# Patient Record
Sex: MALE | Race: BLACK OR AFRICAN AMERICAN | Employment: FULL TIME | URBAN - METROPOLITAN AREA
[De-identification: names, ages, dates, MRNs, and addresses within clinical notes are randomized per-mention and may not be internally consistent; named-entity substitution may affect disease eponyms.]

---

## 2020-08-07 ENCOUNTER — APPOINTMENT (OUTPATIENT)
Dept: GENERAL RADIOLOGY | Facility: CLINIC | Age: 20
DRG: 917 | End: 2020-08-07
Attending: EMERGENCY MEDICINE

## 2020-08-07 ENCOUNTER — HOSPITAL ENCOUNTER (INPATIENT)
Facility: CLINIC | Age: 20
LOS: 1 days | Discharge: HOME | DRG: 917 | End: 2020-08-08
Attending: EMERGENCY MEDICINE | Admitting: INTERNAL MEDICINE

## 2020-08-07 DIAGNOSIS — T40.601A OPIATE OVERDOSE, ACCIDENTAL OR UNINTENTIONAL, INITIAL ENCOUNTER (H): ICD-10-CM

## 2020-08-07 DIAGNOSIS — J96.01 ACUTE RESPIRATORY FAILURE WITH HYPOXIA (H): ICD-10-CM

## 2020-08-07 DIAGNOSIS — J69.0 ASPIRATION PNEUMONITIS (H): ICD-10-CM

## 2020-08-07 PROBLEM — T50.901A DRUG OVERDOSE: Status: ACTIVE | Noted: 2020-08-07

## 2020-08-07 LAB
ALBUMIN SERPL-MCNC: 4 G/DL (ref 3.4–5)
ALP SERPL-CCNC: 57 U/L (ref 40–150)
ALT SERPL W P-5'-P-CCNC: 33 U/L (ref 0–70)
AMPHETAMINES UR QL SCN: NEGATIVE
ANION GAP SERPL CALCULATED.3IONS-SCNC: 10 MMOL/L (ref 3–14)
APAP SERPL-MCNC: <2 MG/L (ref 10–20)
AST SERPL W P-5'-P-CCNC: 37 U/L (ref 0–45)
BARBITURATES UR QL: NEGATIVE
BASOPHILS # BLD AUTO: 0 10E9/L (ref 0–0.2)
BASOPHILS NFR BLD AUTO: 0.4 %
BENZODIAZ UR QL: NEGATIVE
BILIRUB SERPL-MCNC: 0.4 MG/DL (ref 0.2–1.3)
BUN SERPL-MCNC: 11 MG/DL (ref 7–30)
CALCIUM SERPL-MCNC: 8.4 MG/DL (ref 8.5–10.1)
CANNABINOIDS UR QL SCN: POSITIVE
CHLORIDE SERPL-SCNC: 106 MMOL/L (ref 94–109)
CO2 SERPL-SCNC: 25 MMOL/L (ref 20–32)
COCAINE UR QL: NEGATIVE
CREAT SERPL-MCNC: 1.05 MG/DL (ref 0.66–1.25)
DIFFERENTIAL METHOD BLD: ABNORMAL
EOSINOPHIL # BLD AUTO: 0.6 10E9/L (ref 0–0.7)
EOSINOPHIL NFR BLD AUTO: 6 %
ERYTHROCYTE [DISTWIDTH] IN BLOOD BY AUTOMATED COUNT: 13.5 % (ref 10–15)
ETHANOL SERPL-MCNC: <0.01 G/DL
GFR SERPL CREATININE-BSD FRML MDRD: >90 ML/MIN/{1.73_M2}
GLUCOSE SERPL-MCNC: 133 MG/DL (ref 70–99)
HCT VFR BLD AUTO: 46.6 % (ref 40–53)
HGB BLD-MCNC: 14.5 G/DL (ref 13.3–17.7)
IMM GRANULOCYTES # BLD: 0 10E9/L (ref 0–0.4)
IMM GRANULOCYTES NFR BLD: 0.3 %
LYMPHOCYTES # BLD AUTO: 4.9 10E9/L (ref 0.8–5.3)
LYMPHOCYTES NFR BLD AUTO: 52.9 %
MAGNESIUM SERPL-MCNC: 2 MG/DL (ref 1.6–2.3)
MCH RBC QN AUTO: 31 PG (ref 26.5–33)
MCHC RBC AUTO-ENTMCNC: 31.1 G/DL (ref 31.5–36.5)
MCV RBC AUTO: 100 FL (ref 78–100)
MONOCYTES # BLD AUTO: 0.5 10E9/L (ref 0–1.3)
MONOCYTES NFR BLD AUTO: 5.8 %
NEUTROPHILS # BLD AUTO: 3.2 10E9/L (ref 1.6–8.3)
NEUTROPHILS NFR BLD AUTO: 34.6 %
NRBC # BLD AUTO: 0 10*3/UL
NRBC BLD AUTO-RTO: 0 /100
OPIATES UR QL SCN: NEGATIVE
PCP UR QL SCN: NEGATIVE
PLATELET # BLD AUTO: 267 10E9/L (ref 150–450)
POTASSIUM SERPL-SCNC: 3.9 MMOL/L (ref 3.4–5.3)
PROT SERPL-MCNC: 7.3 G/DL (ref 6.8–8.8)
RBC # BLD AUTO: 4.67 10E12/L (ref 4.4–5.9)
SALICYLATES SERPL-MCNC: <2 MG/DL
SODIUM SERPL-SCNC: 141 MMOL/L (ref 133–144)
TROPONIN I SERPL-MCNC: 0.16 UG/L (ref 0–0.04)
TROPONIN I SERPL-MCNC: 1.32 UG/L (ref 0–0.04)
WBC # BLD AUTO: 9.2 10E9/L (ref 4–11)

## 2020-08-07 PROCEDURE — U0003 INFECTIOUS AGENT DETECTION BY NUCLEIC ACID (DNA OR RNA); SEVERE ACUTE RESPIRATORY SYNDROME CORONAVIRUS 2 (SARS-COV-2) (CORONAVIRUS DISEASE [COVID-19]), AMPLIFIED PROBE TECHNIQUE, MAKING USE OF HIGH THROUGHPUT TECHNOLOGIES AS DESCRIBED BY CMS-2020-01-R: HCPCS | Performed by: EMERGENCY MEDICINE

## 2020-08-07 PROCEDURE — 84484 ASSAY OF TROPONIN QUANT: CPT | Performed by: INTERNAL MEDICINE

## 2020-08-07 PROCEDURE — 80053 COMPREHEN METABOLIC PANEL: CPT | Performed by: EMERGENCY MEDICINE

## 2020-08-07 PROCEDURE — 93005 ELECTROCARDIOGRAM TRACING: CPT

## 2020-08-07 PROCEDURE — 80329 ANALGESICS NON-OPIOID 1 OR 2: CPT | Performed by: EMERGENCY MEDICINE

## 2020-08-07 PROCEDURE — 93010 ELECTROCARDIOGRAM REPORT: CPT | Performed by: INTERNAL MEDICINE

## 2020-08-07 PROCEDURE — 85025 COMPLETE CBC W/AUTO DIFF WBC: CPT | Performed by: EMERGENCY MEDICINE

## 2020-08-07 PROCEDURE — 12000000 ZZH R&B MED SURG/OB

## 2020-08-07 PROCEDURE — 80307 DRUG TEST PRSMV CHEM ANLYZR: CPT | Performed by: EMERGENCY MEDICINE

## 2020-08-07 PROCEDURE — 71046 X-RAY EXAM CHEST 2 VIEWS: CPT

## 2020-08-07 PROCEDURE — 99285 EMERGENCY DEPT VISIT HI MDM: CPT | Mod: 25

## 2020-08-07 PROCEDURE — C9803 HOPD COVID-19 SPEC COLLECT: HCPCS

## 2020-08-07 PROCEDURE — 83735 ASSAY OF MAGNESIUM: CPT | Performed by: EMERGENCY MEDICINE

## 2020-08-07 PROCEDURE — 36415 COLL VENOUS BLD VENIPUNCTURE: CPT | Performed by: INTERNAL MEDICINE

## 2020-08-07 PROCEDURE — 84484 ASSAY OF TROPONIN QUANT: CPT | Performed by: EMERGENCY MEDICINE

## 2020-08-07 PROCEDURE — 99223 1ST HOSP IP/OBS HIGH 75: CPT | Mod: AI | Performed by: INTERNAL MEDICINE

## 2020-08-07 PROCEDURE — 80320 DRUG SCREEN QUANTALCOHOLS: CPT | Performed by: EMERGENCY MEDICINE

## 2020-08-07 RX ORDER — POTASSIUM CHLORIDE 1500 MG/1
20-40 TABLET, EXTENDED RELEASE ORAL
Status: DISCONTINUED | OUTPATIENT
Start: 2020-08-07 | End: 2020-08-08 | Stop reason: HOSPADM

## 2020-08-07 RX ORDER — POTASSIUM CL/LIDO/0.9 % NACL 10MEQ/0.1L
10 INTRAVENOUS SOLUTION, PIGGYBACK (ML) INTRAVENOUS
Status: DISCONTINUED | OUTPATIENT
Start: 2020-08-07 | End: 2020-08-08 | Stop reason: HOSPADM

## 2020-08-07 RX ORDER — POTASSIUM CHLORIDE 29.8 MG/ML
20 INJECTION INTRAVENOUS
Status: DISCONTINUED | OUTPATIENT
Start: 2020-08-07 | End: 2020-08-08 | Stop reason: HOSPADM

## 2020-08-07 RX ORDER — HEPARIN SODIUM 10000 [USP'U]/100ML
700 INJECTION, SOLUTION INTRAVENOUS CONTINUOUS
Status: DISCONTINUED | OUTPATIENT
Start: 2020-08-07 | End: 2020-08-08

## 2020-08-07 RX ORDER — MAGNESIUM SULFATE HEPTAHYDRATE 40 MG/ML
4 INJECTION, SOLUTION INTRAVENOUS EVERY 4 HOURS PRN
Status: DISCONTINUED | OUTPATIENT
Start: 2020-08-07 | End: 2020-08-08 | Stop reason: HOSPADM

## 2020-08-07 RX ORDER — LIDOCAINE 40 MG/G
CREAM TOPICAL
Status: DISCONTINUED | OUTPATIENT
Start: 2020-08-07 | End: 2020-08-08 | Stop reason: HOSPADM

## 2020-08-07 RX ORDER — POTASSIUM CHLORIDE 7.45 MG/ML
10 INJECTION INTRAVENOUS
Status: DISCONTINUED | OUTPATIENT
Start: 2020-08-07 | End: 2020-08-08 | Stop reason: HOSPADM

## 2020-08-07 RX ORDER — NALOXONE HYDROCHLORIDE 0.4 MG/ML
.1-.4 INJECTION, SOLUTION INTRAMUSCULAR; INTRAVENOUS; SUBCUTANEOUS
Status: DISCONTINUED | OUTPATIENT
Start: 2020-08-07 | End: 2020-08-08 | Stop reason: HOSPADM

## 2020-08-07 RX ORDER — POTASSIUM CHLORIDE 1.5 G/1.58G
20-40 POWDER, FOR SOLUTION ORAL
Status: DISCONTINUED | OUTPATIENT
Start: 2020-08-07 | End: 2020-08-08 | Stop reason: HOSPADM

## 2020-08-07 RX ORDER — ALBUTEROL SULFATE 90 UG/1
2 AEROSOL, METERED RESPIRATORY (INHALATION) EVERY 4 HOURS PRN
Status: DISCONTINUED | OUTPATIENT
Start: 2020-08-07 | End: 2020-08-08 | Stop reason: HOSPADM

## 2020-08-07 RX ORDER — FLUTICASONE PROPIONATE 44 UG/1
2 AEROSOL, METERED RESPIRATORY (INHALATION) EVERY 12 HOURS
Status: DISCONTINUED | OUTPATIENT
Start: 2020-08-07 | End: 2020-08-08 | Stop reason: HOSPADM

## 2020-08-07 RX ORDER — ASPIRIN 81 MG/1
81 TABLET ORAL DAILY
Status: DISCONTINUED | OUTPATIENT
Start: 2020-08-08 | End: 2020-08-08 | Stop reason: HOSPADM

## 2020-08-07 ASSESSMENT — ENCOUNTER SYMPTOMS
CHEST TIGHTNESS: 1
COUGH: 1
ABDOMINAL PAIN: 0
VOMITING: 1
HEADACHES: 0

## 2020-08-07 ASSESSMENT — MIFFLIN-ST. JEOR: SCORE: 1721.12

## 2020-08-07 NOTE — ED TRIAGE NOTES
Patient arrives from home after overdosing on 30 mg of percocet per patient. EMS reports snoring respirations on scene. Patient bagged with NPA in en route. 0.5 mg IV narcan and patient became alert and oriented. One episode of emesis. Vitally stable en route. Here, patient is awake and alert. Airway patent.

## 2020-08-07 NOTE — ED NOTES
[facility]  ED Nurse Handoff Report    Hugh Song is a 20 year old male   ED Chief complaint: Drug Overdose  . ED Diagnosis:   Final diagnoses:   Aspiration pneumonitis (H)   Acute respiratory failure with hypoxia (H)   Opiate overdose, accidental or unintentional, initial encounter (H)     Allergies: No Known Allergies    Code Status: Please review  Activity level - Baseline/Home:  Independent. Activity Level - Current:   Stand by Assist. Lift room needed: No. Bariatric: No   Needed: No   Isolation: No. Infection: Not Applicable.     Vital Signs:   Vitals:    08/07/20 1720 08/07/20 1726 08/07/20 1731 08/07/20 1732   BP:  122/78     Pulse:  92     Resp: 17 15 12    Temp:       TempSrc:       SpO2: 92%  (!) 87% 93%       Cardiac Rhythm:  ,      Pain level:    Patient confused: No. Patient Falls Risk: Yes.   Elimination Status: Has not yet voided   Patient Report - Initial Complaint: Patient comes to ED via EMS for evaluation of overdose. Patien states that he took 30mg of percocet recreationally. He denies thoughts of harming himself or others. Patient was found with decreased LOC by EMS and needed airway support via NPA and respirations via bag-valve mask. He received 0.5mg narcan en route. He did vomit en route. Patient continues to be hypoxic on room air and is being admitted for drug overdose and aspiration pneumonia. Focused Assessment: Course breath sounds in bases bilaterally. Patient is currently alert. He is on 4L NC with SpO2 99%. He does have an elevated troponin.  Tests Performed:   Labs Ordered and Resulted from Time of ED Arrival Up to the Time of Departure from the ED   COMPREHENSIVE METABOLIC PANEL - Abnormal; Notable for the following components:       Result Value    Glucose 133 (*)     Calcium 8.4 (*)     All other components within normal limits   CBC WITH PLATELETS DIFFERENTIAL - Abnormal; Notable for the following components:    MCHC 31.1 (*)     All other  components within normal limits   TROPONIN I - Abnormal; Notable for the following components:    Troponin I ES 0.159 (*)     All other components within normal limits   ALCOHOL ETHYL   ACETAMINOPHEN LEVEL   SALICYLATE LEVEL   GLUCOSE MONITOR NURSING POCT   DRUG ABUSE SCREEN 77 URINE (FL, RH, SH)   COVID-19 VIRUS (CORONAVIRUS) BY PCR   PERIPHERAL IV CATHETER   CARDIAC CONTINUOUS MONITORING     XR Chest 2 Views   Final Result   IMPRESSION: Negative chest.        . Abnormal Results: see above  Treatments provided: No treatments given here, aside from oxygen.  Family Comments: wife at bedside  OBS brochure/video discussed/provided to patient:  N/A  ED Medications: Medications - No data to display  Drips infusing:  No  For the majority of the shift, the patient's behavior Green. Interventions performed were NA.    Sepsis treatment initiated: No       ED Nurse Name/Phone Number: Mariya Cevallos RN,   6:12 PM  RECEIVING UNIT ED HANDOFF REVIEW    Above ED Nurse Handoff Report was reviewed: Yes  Reviewed by: Lara Tirado, RN on August 7, 2020 at 8:11 PM   Did you vocera the ED RN: YES

## 2020-08-07 NOTE — ED NOTES
Bed: ED22  Expected date: 8/7/20  Expected time: 4:27 PM  Means of arrival:   Comments:  BV5 20M drugs aox3

## 2020-08-07 NOTE — H&P
[facility]    Hospitalist History and Physical    Name: Hugh Song    MRN: [id number]  YOB: 2000    Age: 20 year old  Date of Admission:  8/7/2020  Date of Service (when I saw the patient): 08/07/20    Assessment & Plan   Hugh Song is a 20 year old male with past medical history significant for asthma was brought to the emergency room for drug overdose.  Patient reportedly became unconscious EMS was called required bag ventilation and Narcan.  Patient responded to Narcan with immediate improvement of his breathing and mental status.  Per patient he woke Percocet 30mg to help him sleep denies any suicidal ideation.  Patient says he occasionally uses marijuana and Percocet.  In the emergency room on presentation patient was hypoxic with sats of 87% on room air evaluation showed elevated troponin.    Acute respiratory failure due to drug overdose  -Hypoxic in ER requiring supplemental O2 up to 4 L  -Initially needed bag ventilation.  -Responded to Narcan  -Per report patient occasionally uses Percocet which he gets from his neighbors/friends  -Denies any other drug use denies meth cocaine or alcohol use  -Occasionally takes marijuana  -Clinically improving after Narcan.  Mental status normal  -Wean supplemental O2  -Incentive spirometer  -Monitor continuous pulse oximeter      Drug overdose  -Denies any suicidal ideation  -Using it for recreational purpose  -Stating use of Percocet mainly to help him sleep  -Narcan ordered PRN  -Continuous pulse oximeter  -Tox screen ordered pending    Elevated troponin  -EKG showed early repolarization  -Patient was hypoxic required bag ventilation suspect demand ischemia  -We will trend troponin  -Monitor on telemetry  -Echocardiogram in a.m.  -No chest pain or shortness of breath at this time    History of asthma  -Continue prior to admission albuterol and fluticasone      DVT Prophylaxis: Pneumatic Compression Devices  Code Status: Full  Code    Disposition: Admitted    Primary Care Physician   Physician No Ref-Primary    Chief Complaint   Drug OD    History is obtained from the patient    History of Present Illness   Hugh Song is a 20 year old male with past medical history significant for asthma was brought to the emergency room via EMS.  Patient recalls waking up with EMS personnel helping him with ventilation.  Prior to that he recalls walking towards his home after he used Percocet which he got from his friend or neighbor.  He says he is not suicidal occasionally takes Percocet and uses marijuana.  Today took Percocet to help him sleep.    ER report from EMS patient's brother called 911 and they were both using Percocet for recreational purposes he was found unresponsive.  CPR was initiated and required bag ventilation and responded to Narcan on the field.  When he came to the emergency room he was mildly hypoxic requiring supplemental O2.     EValuation in the emergency room showed elevated troponin    Patient is being admitted for observation and further evaluation.    Past Medical History    Past Medical History:   Diagnosis Date     Uncomplicated asthma          Past Surgical History   History reviewed. No pertinent surgical history.    Prior to Admission Medications   Prior to Admission Medications   Prescriptions Last Dose Informant Patient Reported? Taking?   albuterol (ALBUTEROL) 108 (90 BASE) MCG/ACT inhaler   No No   Sig: Inhale 2 puffs into the lungs every 4 hours as needed for shortness of breath / dyspnea      Facility-Administered Medications: None     Allergies   No Known Allergies    Social History   Social History     Tobacco Use     Smoking status: Smoker, Current Status Unknown     Packs/day: 0.50   Substance Use Topics     Alcohol use: Not on file     Social History     Social History Narrative     Not on file     Lives with mom, has 3 children, quit smoking 2 weeks    Family History   I have reviewed this patient's  family history and updated it with pertinent information if needed.   History reviewed. No pertinent family history.  Denies any significant family history.  Does give family history of hypertension    Review of Systems   A Comprehensive greater than 10 system review of systems was carried out.  Pertinent positives and negatives are noted above.  Otherwise negative for contributory information.    Physical Exam   Temp: 98.2  F (36.8  C) Temp src: Oral BP: 122/71 Pulse: 87 Heart Rate: 86 Resp: 12 SpO2: 98 % O2 Device: Nasal cannula Oxygen Delivery: 4 LPM  Vital Signs with Ranges  Temp:  [98.2  F (36.8  C)] 98.2  F (36.8  C)  Pulse:  [86-92] 87  Heart Rate:  [78-93] 86  Resp:  [12-21] 12  BP: (120-122)/(71-95) 122/71  SpO2:  [87 %-98 %] 98 %  0 lbs 0 oz    GEN:  Alert, oriented x 3, appears comfortable, no overt distress  HEENT:  Normocephalic/atraumatic, no scleral icterus, no nasal discharge, mouth moist.  CV:  Regular rate and rhythm, no murmur or JVD.  S1 + S2 noted, no S3 or S4.  LUNGS:  Clear to auscultation bilaterally without rales/rhonchi/wheezing/retractions.  Symmetric chest rise on inhalation noted.  ABD:  Active bowel sounds, soft, non-tender/non-distended.  No rebound/guarding/rigidity.  EXT:  No edema.  No cyanosis.  No joint synovitis noted.  SKIN:  Dry to touch, no exanthems noted in the visualized areas.  NEURO:  Symmetric muscle strength, No new focal deficits appreciated.    Data   Data reviewed today:  I personally reviewed the EKG tracing showing Normal sinus rhythm with signs of early repolarization and nonspecific ST-T change.    No results for input(s): PH, PHV, PO2, PO2V, SAT, PCO2, PCO2V, HCO3, HCO3V in the last 168 hours.  Recent Labs   Lab 08/07/20  1639   WBC 9.2   HGB 14.5   HCT 46.6           Recent Labs   Lab 08/07/20  1639      POTASSIUM 3.9   CHLORIDE 106   CO2 25   ANIONGAP 10   *   BUN 11   CR 1.05   GFRESTIMATED >90   GFRESTBLACK >90   ARIEL 8.4*     No  results for input(s): CULT in the last 168 hours.  Recent Labs   Lab 08/07/20  1639      POTASSIUM 3.9   CHLORIDE 106   CO2 25   ANIONGAP 10   *   BUN 11   CR 1.05   GFRESTIMATED >90   GFRESTBLACK >90   ARIEL 8.4*   PROTTOTAL 7.3   ALBUMIN 4.0   BILITOTAL 0.4   ALKPHOS 57   AST 37   ALT 33     No results for input(s): NTBNPI, NTBNP in the last 168 hours.  Recent Labs   Lab 08/07/20  1639   AST 37   ALT 33   ALKPHOS 57   BILITOTAL 0.4     No results for input(s): INR in the last 168 hours.  No results for input(s): LACT in the last 168 hours.  No results for input(s): LIPASE in the last 168 hours.  No results for input(s): CHOL, HDL, LDL, TRIG, CHOLHDLRATIO in the last 168 hours.  No results for input(s): TSH in the last 168 hours.  Recent Labs   Lab 08/07/20  1639   TROPI 0.159*     No results for input(s): COLOR, APPEARANCE, URINEGLC, URINEBILI, URINEKETONE, SG, UBLD, URINEPH, PROTEIN, UROBILINOGEN, NITRITE, LEUKEST, RBCU, WBCU in the last 168 hours.    Recent Results (from the past 24 hour(s))   XR Chest 2 Views    Narrative    EXAM: XR CHEST 2 VW  LOCATION: [facility]  DATE/TIME: 8/7/2020 5:34 PM    INDICATION:  opiate overdose, rales bilaterally  COMPARISON: None.      Impression    IMPRESSION: Negative chest.

## 2020-08-07 NOTE — ED PROVIDER NOTES
History     Chief Complaint:  Drug Overdose     HPI  Hugh Song is a 20 year old year old male with a history of asthma who presents for evaluation of drug overdose. The patient arrives via EMS after taking a single pill of 30 mg percocet. He states he has taken 30 mg before but has not had this kind of reaction. He reports he was at home and was using the drug recreationally and was not intending to harm himself or intentionally overdose. He denies any suicidal ideation. The patient was reportedly unarousable with poor respirations. EMS placed a nasopharyngeal airway and bagged the patient prior to arrival. They gave him 0.5mg narcan en route with immediate improvement in his breathing and mental status. The patient just remembers waking up on the floor with EMS and first responders around him. He denies any other recent health problems.  He denies any coingestions.  He reports that he is a prior smoker of tobacco and does continue to smoke marijuana.  He denies any other illicit substance abuse.  He endorses chest tightness, cough, and vomiting. He denies abdominal pain, chest pain, headache, or other pains.  He denies any numbness tingling or weakness.  No recent fevers, illness.  He denies any other symptoms at this time.    Allergies:  No Known Drug Allergies    Medications:   Albuterol inhaler  Flovent diskus    Medical History:   Uncomplicated asthma    Surgical History   Surgical history reviewed. No pertinent surgical history.    Family History:   Family history reviewed. No pertinent family history.    Social History:  Smoking Status:  Smoker    Type: Cigarettes   Packs/Day: 0.5  Smokeless Tobacco: Never Used   Alcohol Use: Not on file  Drug Use: Positive (marijuana, percocet)  No Ref-Primary, Physician     Review of Systems   Respiratory: Positive for cough and chest tightness.    Cardiovascular: Negative for chest pain.   Gastrointestinal: Positive for vomiting. Negative for abdominal pain.    Neurological: Negative for headaches.   Psychiatric/Behavioral: Negative for suicidal ideas.   All other systems reviewed and are negative.    Physical Exam     Patient Vitals for the past 24 hrs:   BP Temp Temp src Pulse Heart Rate Resp SpO2   08/07/20 1732 -- -- -- -- -- -- 93 %   08/07/20 1731 -- -- -- -- -- 12 (!) 87 %   08/07/20 1726 122/78 -- -- 92 88 15 --   08/07/20 1720 -- -- -- -- 90 17 92 %   08/07/20 1638 -- -- -- -- 93 20 90 %   08/07/20 1637 -- -- -- -- 89 21 (!) 89 %   08/07/20 1634 (!) 120/95 98.2  F (36.8  C) Oral -- 89 14 91 %      Physical Exam  General: Nontoxic. Awake and alert. Resting comfortably  Head:  Scalp, face, and head appear normal, atraumatic non tender to palpation.  Eyes:  Pupils are equal, round, and reactive to light, EOMI, no nystagmus     Conjunctivae non-injected and sclerae white  ENT:    The external nose is normal    Pinnae are normal    The oropharynx is normal, mucous membranes moist    Uvula is in the midline  Neck:  Normal range of motion    There is no rigidity noted    Trachea is in the midline  CV:  Regular rate and rhythm     Normal S1/S2, no S3/S4    No murmur or rub. Radial pulses 2+ bilaterally   Resp:  Lungs are equal bilaterally    There is no tachypnea    No increased work of breathing    Coarse rales bilateral bases. No wheezing, or rhonchi  GI:  Abdomen is soft, no rigidity or guarding    No distension, or mass    No tenderness or rebound tenderness   MS:  Normal muscular tone. Full painless ROM of all extremities. Extremities non tender to palpation and atraumatic.    Symmetric motor strength    No lower extremity edema  Skin:  No rash or acute skin lesions noted  Neuro: A&Ox3, GCS 15    CN II - XII intact    Speech clear, fluent, and normal    Strength 5/5 and symmetric in bilateral upper and lower extremities.    No pronator drift. No leg drift. SILT throughout.    No ankle clonus    FTN testing normal. No tremor.     No meningismus   Psych:  Normal  affect.  Appropriate interactions.  Denies any suicidal ideation.  Denies that the overdose was an attempt at self-harm.  Denies any significant depression or anxiety.  No evidence of response to internal stimuli, hallucinations or psychosis.  Thought process is clear and goal-directed.    Emergency Department Course     ECG:  ECG taken at 1709, ECG read at 1723  Normal sinus rhythm  ST elevation, consider early repolarization, pericarditis, or injury  Nonspecific ST and T wave abnormality  Abnormal ECG  Rate 90 bpm. MD interval 174 ms. QRS duration 98 ms. QT/QTc 368/450 ms. P-R-T axes 61 49 44.    Imaging:  Radiology results were communicated with the patient who voiced understanding of the findings.    XR Chest 2 Views  IMPRESSION: Negative chest. Reading per radiology     Laboratory:  Laboratory findings were communicated with the patient who voiced understanding of the findings.    Asymptomatic COVID-19 Virus (Coronavirus) PCR: In process     Troponin I (Collected 1639): 0.159(HH)    Alcohol ethyl: <0.01  Acetaminophen level: <2  Salicylate level: <2    CBC: WBC 9.2, HGB 14.5,   CMP: Glucose 133 (H), Calcium 8.4 (L) (Creatinine 1.05) o/w WNL     Emergency Department Course:  1639 IV was inserted and blood was drawn for laboratory testing, results above.    1646 Nursing notes and vitals reviewed.    1709 EKG obtained as noted above.    1715 I performed an exam of the patient as documented above.     1734 The patient was sent for XR while in the emergency department, results above.     1847 I consulted with Dr. Valverde of the hospitalist services. They are in agreement to accept the patient for admission. Findings and plan explained to the Patient who consents to admission. Discussed the patient with Dr. Valverde, who will admit the patient to a cardiac tele bed for further monitoring, evaluation, and treatment.    Impression & Plan     Covid-19  Hugh Song was evaluated during a global COVID-19  pandemic, which necessitated consideration that the patient might be at risk for infection with the SARS-CoV-2 virus that causes COVID-19.   Applicable protocols for evaluation were followed during the patient's care. COVID-19 was considered as part of the patient's evaluation. The plan for testing is: a test was obtained during this visit.    Medical Decision Making:  Hugh Song is a 20 year old male otherwise healthy, who presents after unintentional opiate overdose.  On my evaluation he is now awake alert and oriented appropriately.  He has no focal neurologic deficits.  He is hemodynamically stable and afebrile.  He does report chest tightness and cough with mild shortness of breath.  He does have mild hypoxia with oxygen saturations of 87% on room air.  On 3 L nasal cannula oxygen saturations improved to the mid 90s.  The patient was given assisted respirations by BVM prior to receiving Narcan.  Upon receiving Narcan his breathing and mental status improved significantly.  Patient denies any coingestions.  He denies any intent at self-harm, overdose or suicide.  Work-up in the emergency department feels normal CMP, CBC.  No hypoglycemia.  Ethanol, acetaminophen, salicylate levels are negative.  EKG reveals nonspecific ST changes.  Chest x-ray is negative for any significant pulmonary edema, pneumothorax, pleural effusions or other acute thoracic pathology.  Patient's hypoxia is likely due to aspiration pneumonitis secondary to the opiate overdose and assisted respirations by bag valve mask.  Given the nonspecific ST changes and probable period of hypoxia troponin was obtained and was elevated.  This is likely secondary to his period of hypoxia after overdosing.  Signs and symptoms are not consistent with acute coronary syndrome.  Given his aspiration pneumonitis with hypoxia, elevated troponin and risk for recurrent somnolence patient will be admitted to hospitalist for ongoing monitoring evaluation and  treatment.  The case was discussed with the hospitalist and the patient was admitted in stable condition.    Diagnosis:     ICD-10-CM    1. Aspiration pneumonitis (H)  J69.0    2. Acute respiratory failure with hypoxia (H)  J96.01    3. Opiate overdose, accidental or unintentional, initial encounter (H)  T40.601A       Disposition:  Admitted     Scribe Disclosure:  Lara BEAR, am serving as a scribe at 4:57 PM on 8/7/2020 to document services personally performed by Joselito Fu MD based on my observations and the provider's statements to me.      Joselito Fu MD  08/07/20 [id number]

## 2020-08-08 ENCOUNTER — APPOINTMENT (OUTPATIENT)
Dept: CARDIOLOGY | Facility: CLINIC | Age: 20
DRG: 917 | End: 2020-08-08
Attending: INTERNAL MEDICINE

## 2020-08-08 VITALS
TEMPERATURE: 98.2 F | HEIGHT: 68 IN | DIASTOLIC BLOOD PRESSURE: 52 MMHG | OXYGEN SATURATION: 100 % | WEIGHT: 163.5 LBS | BODY MASS INDEX: 24.78 KG/M2 | SYSTOLIC BLOOD PRESSURE: 106 MMHG | RESPIRATION RATE: 16 BRPM | HEART RATE: 64 BPM

## 2020-08-08 LAB
ANION GAP SERPL CALCULATED.3IONS-SCNC: 1 MMOL/L (ref 3–14)
BUN SERPL-MCNC: 9 MG/DL (ref 7–30)
CALCIUM SERPL-MCNC: 8.3 MG/DL (ref 8.5–10.1)
CHLORIDE SERPL-SCNC: 108 MMOL/L (ref 94–109)
CO2 SERPL-SCNC: 31 MMOL/L (ref 20–32)
CREAT SERPL-MCNC: 0.89 MG/DL (ref 0.66–1.25)
GFR SERPL CREATININE-BSD FRML MDRD: >90 ML/MIN/{1.73_M2}
GLUCOSE SERPL-MCNC: 81 MG/DL (ref 70–99)
LMWH PPP CHRO-ACNC: 0.48 IU/ML
MAGNESIUM SERPL-MCNC: 1.9 MG/DL (ref 1.6–2.3)
POTASSIUM SERPL-SCNC: 4 MMOL/L (ref 3.4–5.3)
SARS-COV-2 RNA SPEC QL NAA+PROBE: NOT DETECTED
SODIUM SERPL-SCNC: 140 MMOL/L (ref 133–144)
SPECIMEN SOURCE: NORMAL
TROPONIN I SERPL-MCNC: 0.71 UG/L (ref 0–0.04)

## 2020-08-08 PROCEDURE — 84484 ASSAY OF TROPONIN QUANT: CPT | Performed by: INTERNAL MEDICINE

## 2020-08-08 PROCEDURE — 36415 COLL VENOUS BLD VENIPUNCTURE: CPT | Performed by: INTERNAL MEDICINE

## 2020-08-08 PROCEDURE — 25000128 H RX IP 250 OP 636: Performed by: INTERNAL MEDICINE

## 2020-08-08 PROCEDURE — 93005 ELECTROCARDIOGRAM TRACING: CPT

## 2020-08-08 PROCEDURE — 93306 TTE W/DOPPLER COMPLETE: CPT

## 2020-08-08 PROCEDURE — 93306 TTE W/DOPPLER COMPLETE: CPT | Mod: 26 | Performed by: INTERNAL MEDICINE

## 2020-08-08 PROCEDURE — 83735 ASSAY OF MAGNESIUM: CPT | Performed by: INTERNAL MEDICINE

## 2020-08-08 PROCEDURE — 99239 HOSP IP/OBS DSCHRG MGMT >30: CPT | Performed by: INTERNAL MEDICINE

## 2020-08-08 PROCEDURE — 25000132 ZZH RX MED GY IP 250 OP 250 PS 637: Performed by: INTERNAL MEDICINE

## 2020-08-08 PROCEDURE — 85520 HEPARIN ASSAY: CPT | Performed by: INTERNAL MEDICINE

## 2020-08-08 PROCEDURE — 80048 BASIC METABOLIC PNL TOTAL CA: CPT | Performed by: INTERNAL MEDICINE

## 2020-08-08 PROCEDURE — 99222 1ST HOSP IP/OBS MODERATE 55: CPT | Performed by: INTERNAL MEDICINE

## 2020-08-08 RX ADMIN — Medication 4000 UNITS: at 00:42

## 2020-08-08 RX ADMIN — ASPIRIN 81 MG: 81 TABLET, COATED ORAL at 09:29

## 2020-08-08 RX ADMIN — HEPARIN SODIUM 850 UNITS/HR: 10000 INJECTION, SOLUTION INTRAVENOUS at 00:44

## 2020-08-08 ASSESSMENT — ACTIVITIES OF DAILY LIVING (ADL)
ADLS_ACUITY_SCORE: 15

## 2020-08-08 ASSESSMENT — MIFFLIN-ST. JEOR: SCORE: 1716.13

## 2020-08-08 NOTE — PROGRESS NOTES
CTS     Per chart review, patient has no established PCP. Met with patient for establishing care with PCP. Discussed importance of following up and establishing care of his choice. Patient states he is new to the [facility]. He requested to follow up with [facility] provider. A follow up appointment was scheduled with Jennifer Allen at [facility] on Tuesday, August 18th at 1:20 pm.     Patient reports being in a safe environment at home with his family. His s.o, Ida, will provide transportation at discharge. Patient offered resources for chem dep. He declined, stating he doesn't plan on using percocet again. Patient states he will follow up with [facility].     CTS will follow for any additional discharge needs.     Ani Pedro RN BSN PHN CCM   Inpatient Care Coordination   [facility]   [phone]

## 2020-08-08 NOTE — PROGRESS NOTES
"    Brief Hospitalist Cross cover note    Reason for the call : EKG review @7907    Actions taken :    EMR reviewed.    Discussed with treatment team- yes.   o No cp or sob   o Stable vital signs     Patient seen and examined :no     Significant findings/Current problem:    Vital signs : /45 (BP Location: Right arm)   Pulse 64   Temp 98.2  F (36.8  C) (Oral)   Resp 14   Ht 1.727 m (5' 8\")   Wt 74.7 kg (164 lb 9.6 oz)   SpO2 98%   BMI 25.03 kg/m       Abnormal EKG with ST segment elevation in lateral leads but no significant change compared with earlier EKG 17:09  Lab Results   Component Value Date    TROPI 1.325 () 08/07/2020    TROPI 0.159 () 08/07/2020           Suspect ACS   Recommendation/Plan:    Continue heparin and symptom monitoring     Echo and cardiology consult planned     Critical Care time: none    "

## 2020-08-08 NOTE — DISCHARGE INSTRUCTIONS
Your hospital follow up appointment has been scheduled for you with Jennifer Allen at [facility] for Tuesday, August 18th at 1:20pm.  Please bring your hospital discharge instructions, a photo ID, and insurance information with you to your appointment.  Please call the clinic at [phone] if you need to reschedule.

## 2020-08-08 NOTE — PLAN OF CARE
RN-  Neuro- A&O.   Activity- Up in room with SBA.   Pain- Denies.   Respiratory- Lungs clear. 3LNC.   Cardiac- NSR.   Musculoskeletal- No concerns noted.   GI/- Adequate UO.   Skin- No concerns noted.   Admitting physician notified of 2nd Trop trending up, no CP, VSS. No new orders. Will continue to monitor. Plan for ECHO in the AM. Bed alarm in place. Continue current POC.

## 2020-08-08 NOTE — PROGRESS NOTES
RN- Reviewed discharge instructions with patient. Verbalizes understanding. Signed discharge papers. Has F/U with PCP scheduled for 08-. Mother to transport home.

## 2020-08-08 NOTE — DISCHARGE SUMMARY
[facility]  Discharge Summary  Name: Hugh Song    MRN: [id number]  YOB: 2000    Age: 20 year old  Date of Discharge:  8/8/2020  Date of Admission: 8/7/2020  Primary Care Provider: No Ref-Primary, Physician  Discharge Physician:  Lorenzo De La Rosa MD  Discharging Service:  Hospitalist      Hospital Course/Discharge Diagnoses:  Please see HPI and initial assessment and plan from Dr. Valverde's H&P yesterday as italicized below:     History of Present Illness     Hugh Song is a 20 year old male with past medical history significant for asthma was brought to the emergency room via EMS.  Patient recalls waking up with EMS personnel helping him with ventilation.  Prior to that he recalls walking towards his home after he used Percocet which he got from his friend or neighbor.  He says he is not suicidal occasionally takes Percocet and uses marijuana.  Today took Percocet to help him sleep.     ER report from EMS patient's brother called 911 and they were both using Percocet for recreational purposes he was found unresponsive.  CPR was initiated and required bag ventilation and responded to Narcan on the field.  When he came to the emergency room he was mildly hypoxic requiring supplemental O2.      EValuation in the emergency room showed elevated troponin     Patient is being admitted for observation and further evaluation.    Assessment and Plan    Hugh Song is a 20 year old male with past medical history significant for asthma was brought to the emergency room for drug overdose.  Patient reportedly became unconscious EMS was called required bag ventilation and Narcan.  Patient responded to Narcan with immediate improvement of his breathing and mental status.  Per patient he woke Percocet 30mg to help him sleep denies any suicidal ideation.  Patient says he occasionally uses marijuana and Percocet.  In the emergency room on presentation patient was hypoxic with  sats of 87% on room air evaluation showed elevated troponin.     Acute respiratory failure due to drug overdose  -Hypoxic in ER requiring supplemental O2 up to 4 L  -Initially needed bag ventilation.  -Responded to Narcan  -Per report patient occasionally uses Percocet which he gets from his neighbors/friends  -Denies any other drug use denies meth cocaine or alcohol use  -Occasionally takes marijuana  -Clinically improving after Narcan.  Mental status normal  -Wean supplemental O2  -Incentive spirometer  -Monitor continuous pulse oximeter        Drug overdose  -Denies any suicidal ideation  -Using it for recreational purpose  -Stating use of Percocet mainly to help him sleep  -Narcan ordered PRN  -Continuous pulse oximeter  -Tox screen ordered pending     Elevated troponin  -EKG showed early repolarization  -Patient was hypoxic required bag ventilation suspect demand ischemia  -We will trend troponin  -Monitor on telemetry  -Echocardiogram in a.m.  -No chest pain or shortness of breath at this time     History of asthma  -Continue prior to admission albuterol and fluticasone        Hospital Course:  Overnight Mr. Song's troponin stephany and he was started on a heparin drip.  He denies any cardiopulmonary complaints.  He was seen by cardiology who felt his troponin elevation was likely related to his overdose and associated chest compressions that he was given when he was unresponsive.  No further cardiac work-up was recommended but of course we have stressed to Mr. Song the absolute need to stop recreationally using narcotics given the apparent stress this has put on his body in life-threatening situation which seems to have resulted in.  He denies any intent at self-harm.  At this point he is hopeful to discharge home today and he does seem to be clinically stable to do so.  Troponin has trended down and he denies any cardiopulmonary complaints and is fully alert, awake and oriented.  He expresses good  "understanding of reasons to return to the hospital for reevaluation and have asked that he establish care locally to get a primary care doctor.    1.  Recreational narcotic overdose resulting in an unresponsive state.  He was given CPR but it is not clear that he ever truly lost pulses.  Responded to Narcan.  No apparent deficits at this time mental status is normal as are hemodynamics and respiratory status.    2.  Elevated troponin likely related to cardiac injury from chest compressions and hypoxia from narcotic overdose.  Echocardiogram is grossly normal.    3.  Asthma, not in acute exacerbation      Discharge Disposition:  Discharged to home     Allergies:  No Known Allergies     Discharge Medications:        Review of your medicines      CONTINUE these medicines which have NOT CHANGED      Dose / Directions   albuterol 108 (90 Base) MCG/ACT inhaler  Commonly known as:  ProAir HFA      Dose:  2 puff  Inhale 2 puffs into the lungs every 4 hours as needed for shortness of breath / dyspnea  Quantity:  1 Inhaler  Refills:  0     FLOVENT HFA IN      Inhale into the lungs 2 times daily as needed (SOB, dyspnea)  Refills:  0            Condition on Discharge:  Discharge condition: Stable       Code status on discharge: Full Code     History of Illness:  See detailed admission note for full details.    Physical Exam:  Vital signs:  Temp: 98.2  F (36.8  C) Temp src: Oral BP: 106/52 Pulse: 64 Heart Rate: 60 Resp: 16 SpO2: 100 % O2 Device: Nasal cannula Oxygen Delivery: 3 LPM Height: 172.7 cm (5' 8\") Weight: 74.2 kg (163 lb 8 oz)  Estimated body mass index is 24.86 kg/m  as calculated from the following:    Height as of this encounter: 1.727 m (5' 8\").    Weight as of this encounter: 74.2 kg (163 lb 8 oz).    Wt Readings from Last 1 Encounters:   08/08/20 74.2 kg (163 lb 8 oz)     General: Alert, awake, no acute distress.  HEENT: NC/AT, eyes anicteric, external occular movements intact, face symmetric.  Dentition WNL, MM " moist.  Cardiac: RRR, S1, S2.  No murmurs appreciated.  Pulmonary: Normal chest rise, normal work of breathing.  Lungs CTA BL  Abdomen: soft, non-tender, non-distended.  Bowel Sounds Present.  No guarding.  Extremities: no deformities.  Warm, well perfused.  Skin: no rashes or lesions noted.  Warm and Dry.  Neuro: No focal deficits noted.  Speech clear.  Coordination and strength grossly normal.  Psych: Appropriate affect.    Procedures other than Imaging:  none     Imaging:  Results for orders placed or performed during the hospital encounter of 20   XR Chest 2 Views    Narrative    EXAM: XR CHEST 2 VW  LOCATION: [facility]  DATE/TIME: 2020 5:34 PM    INDICATION:  opiate overdose, rales bilaterally  COMPARISON: None.      Impression    IMPRESSION: Negative chest.   Echocardiogram Complete    Narrative    [id number]  [id number]  [id number]  [id number]^EBONI^KATRIN^THANIA           [facility]  Echocardiography Laboratory  [address] [place] [address]        Name: IZZY LILLY  MRN: [id number]  : 2000  Study Date: 2020 08:28 AM  Age: 20 yrs  Gender: Male  Patient Location: [facility]  Reason For Study: Syncope  Ordering Physician: KATRIN LYN  Performed By: Sirena Mcqueen     BSA: 1.9 m2  Height: 68 in  Weight: 164 lb  HR: 61  BP: 103/60 mmHg  _____________________________________________________________________________  __        Procedure  Complete Portable Echo Adult.  _____________________________________________________________________________  __        Interpretation Summary     1. Normal biventricular size and function. Left ventricular ejection fraction  of 55-60%. No segmental wall motion abnormalities noted.  2. Normal sized atria.  3. No hemodynamically significant valvular disease.  No prior study for comparison.  Technically adequate study.  _____________________________________________________________________________  __        Left  Ventricle  The left ventricle is normal in size. Left ventricular systolic function is  normal. The visual ejection fraction is estimated at 55-60%. Left ventricular  diastolic function is normal. No regional wall motion abnormalities noted.     Right Ventricle  The right ventricle is normal in size and function.     Atria  Normal left atrial size. Right atrial size is normal.     Mitral Valve  The mitral valve leaflets appear normal. There is no evidence of stenosis,  fluttering, or prolapse. There is trace mitral regurgitation.        Tricuspid Valve  Normal tricuspid valve. No tricuspid regurgitation.     Aortic Valve  Normal tricuspid aortic valve. No aortic regurgitation is present.     Pulmonic Valve  Normal pulmonic valve. There is trace pulmonic valvular regurgitation.     Vessels  Normal size aorta. IVC diameter <2.1 cm collapsing >50% with sniff suggests a  normal RA pressure of 3 mmHg.     Pericardium  There is no pericardial effusion.        Rhythm  Sinus rhythm was noted.  _____________________________________________________________________________  __  MMode/2D Measurements & Calculations     IVSd: 0.71 cm  LVIDd: 4.2 cm  LVIDs: 3.0 cm  LVPWd: 0.77 cm  FS: 29.1 %  LV mass(C)d: 92.9 grams  LV mass(C)dI: 49.5 grams/m2  Ao root diam: 2.8 cm  LA dimension: 3.4 cm  asc Aorta Diam: 2.8 cm  LA/Ao: 1.2  LVOT diam: 2.2 cm  LVOT area: 3.7 cm2  LA Volume (BP): 40.7 ml  LA Volume Index (BP): 21.6 ml/m2  RWT: 0.36           Doppler Measurements & Calculations  MV E max charisse: 79.3 cm/sec  MV A max charisse: 29.5 cm/sec  MV E/A: 2.7  MV max PG: 3.5 mmHg  MV mean P.1 mmHg  MV V2 VTI: 35.7 cm  MV dec time: 0.34 sec  PA acc time: 0.13 sec  E/E' av.8  Lateral E/e': 4.2  Medial E/e': 5.5           _____________________________________________________________________________  __           Report approved by: Kourtney Hughes 2020 10:45 AM             Consultations:  Consultation during this admission received  from cardiology.       Recent Lab Results:  Recent Labs   Lab 08/07/20  1639   WBC 9.2   HGB 14.5   HCT 46.6           Recent Labs   Lab 08/08/20  0615 08/07/20  1639    141   POTASSIUM 4.0 3.9   CHLORIDE 108 106   CO2 31 25   ANIONGAP 1* 10   GLC 81 133*   BUN 9 11   CR 0.89 1.05   GFRESTIMATED >90 >90   GFRESTBLACK >90 >90   ARIEL 8.3* 8.4*     Recent Labs   Lab 08/08/20  0147 08/07/20  2158 08/07/20  1639   TROPI 0.714* 1.325* 0.159*          Pending Results:    Unresulted Labs Ordered in the Past 30 Days of this Admission     Date and Time Order Name Status Description    8/7/2020 1810 Asymptomatic COVID-19 Virus (Coronavirus) by PCR In process            Discharge Instructions and Follow-Up:   Discharge Procedure Orders   Reason for your hospital stay   Order Comments: You were admitted for narcotic overdose related to recreational use of Percocet.  this overdose was reversed with a medication called Narcan.  Related to low oxygen levels from the overdose and likely also related to the CPR/chest compressions that he received a cardiac enzyme called troponin was elevated which is evidence that your heart was under a great deal of stress during all of this.    Thankfully, you seem to be recovering well and the cardiologist here did not feel that further cardiac work-up was needed.  It will be absolutely imperative that you stop using recreational narcotics to avoid further life-threatening incidents.     Follow-up and recommended labs and tests    Order Comments: Please follow-up to establish care with a primary care doctor.     Activity   Order Comments: Your activity upon discharge: gradually resume light activity as tolerated.     Order Specific Question Answer Comments   Is discharge order? Yes      When to contact your care team   Order Comments: Please return for further evaluation if you develop chest pain, heart palpitations/abnormal heartbeat, shortness of breath, confusion, fever or  any other new concerning symptom.     Diet   Order Comments: Follow this diet upon discharge: Orders Placed This Encounter      Combination Diet Low Saturated Fat Na <2400mg Diet, No Caffeine Diet     Order Specific Question Answer Comments   Is discharge order? Yes        Total time spent in face to face contact with the patient and coordinating discharge was:  40 Minutes.

## 2020-08-08 NOTE — CONSULTS
Consult Date:  08/08/2020      CARDIOLOGY CONSULTATION      REQUESTING PHYSICIAN:  Dr. Valverde.      REASON FOR REFERRAL:  Elevated cardiac enzymes.      HISTORY OF PRESENT ILLNESS:  I have been asked to evaluate this 20-year-old for the above.  He came with an admitted drug overdose.  He takes opiates recreationally and had admitted to taking 30 mg tablet of Percocet.  He was found down unarousable with poor respirations.  According to the ED, he did receive CPR and had a nasopharyngeal airway and was bagged by EMS upon arrival.  He was given Narcan en route which improved his mental status and breathing significantly.  He was admitted to the cardiac telemetry unit.  There has been no evidence of arrhythmias on telemetry.  I did review his admission electrocardiogram which shows a sinus rhythm, J point elevation seen diffusely, minor T-wave abnormality seen in lead III.  He had an echocardiogram performed yesterday which showed normal LV function, no significant wall motion abnormalities.  No valve issues.  RV function was normal as well.  Urine tox screen was positive for cannabinoids.  His initial troponin level was 0.159, subsequent 1.325 and the last one was 0.714.  He is resting comfortably in bed and without chest pain or difficulty breathing currently.      PAST MEDICAL HISTORY:  Asthma with occasional inhaler use.      MEDICATIONS:  Albuterol and Flovent.      ALLERGIES:  No known drug allergies.      PAST SURGICAL HISTORY:  No surgical history.      FAMILY HISTORY:  No family history of premature coronary disease.      SOCIAL HISTORY:  Recreational drug abuse, marijuana, Percocet and cigarettes.      REVIEW OF SYSTEMS:  He denies history of chest pain.  He does have occasional difficulty with chest tightness and breathing which he relates to his asthma, readily relieved with inhalers.  The rest of 10-point review of systems is negative.      PHYSICAL EXAMINATION:   VITAL SIGNS:  He is normotensive.  Blood  pressure is ranging anywhere from 103-122 systolic over 45-78 diastolic.  Heart rates in the 60s-80s.  He is afebrile.  His respiratory rate is now 16.  He is oxygenating greater than 98% on room air.   GENERAL:  He is a young male, no apparent distress, eupneic on exam with conversation.   HEENT:  Head appears atraumatic.   NECK:  Supple.  I do not appreciate jugular venous distention.   SKIN:  Warm, dry.   CARDIOVASCULAR:  Tones are regular without murmur, gallop or rub.   LUNGS:  Clear posteriorly.   ABDOMEN:  Soft.  He has palpable pulses in the distal extremities without peripheral edema.   NEUROLOGIC:  He is alert and oriented.  There are no gross focal neurologic abnormalities.        LABORATORY DATA:   Labs are described as above.      ASSESSMENT AND PLAN:  This is a 20-year-old male with underlying history of asthma who presented with recreational opiate use and respiratory distress on arrival unarousable.  He did receive cardiopulmonary resuscitation according to the Emergency Room note initially for resuscitation as well as ventilation Narcan significantly improved his respiratory status and mental status.  He had a mild troponin elevation with a peak of 1.3; however, his echocardiogram does not suggest evidence of left ventricular dysfunction or regional wall motion abnormality.  The troponin elevation may very well be related to the cardiopulmonary resuscitation that was initiated, I do not see any need for further workup from a cardiac perspective here.  Obviously, he needs a lecture about using recreational drugs and their dangers and I do not feel any further cardiac workup is necessary at this time.  Please feel free to contact me with any questions you have in regards to his care.         OSCAR WHEATLEY DO             D: 2020   T: 2020   MT:       Name:     IZZY ILLLY   MRN:      [id number]        Account:       [id number]   :      2000           Consult  Date:  08/08/2020      Document: [id number]

## 2020-08-08 NOTE — PLAN OF CARE
A&OX4. Up with SBA, Bed alarm is on. Denies any pain. Started heparin since  2nd troponin was elevated. Heparin is  infusing @ 8.5ml/hr. Lungs sounds clear, pt. is on 3L O2 for comfort. Tele: SR/SB W/ ST elevated. Skin is intact.    Treatment plan: NPO since Midnight, Echocardiogram is scheduled for this morning per MD note. Cardiology consult.

## 2020-08-08 NOTE — PHARMACY-ADMISSION MEDICATION HISTORY
Admission medication history interview status for this patient is complete. See [facility] admission navigator for allergy information, prior to admission medications and immunization status.     Medication history interview done via telephone during Covid-19 pandemic, indicate source(s): Patient  Medication history resources (including written lists, pill bottles, clinic record):None    Changes made to PTA medication list:  Added: Flovent inhaler  Deleted: None  Changed: None    Actions taken by pharmacist (provider contacted, etc):None     Additional medication history information:Patient is unsure of Flovent dose, does not know significant other's phone number and does not recall what pharmacy in [place] his prescriptions were last filled at. Has not taken in at least a couple of months (takes as needed).     Medication reconciliation/reorder completed by provider prior to medication history?  N   (Y/N)     Prior to Admission medications    Medication Sig Last Dose Taking? Auth Provider   albuterol (ALBUTEROL) 108 (90 BASE) MCG/ACT inhaler Inhale 2 puffs into the lungs every 4 hours as needed for shortness of breath / dyspnea More than a month at Unknown time  Josee Williamson MD   Fluticasone Propionate HFA (FLOVENT HFA IN) Inhale into the lungs 2 times daily as needed (SOB, dyspnea) More than a month at Unknown time  Unknown, Entered By History        Minoo Logan, PharmD, [facility]  Emergency Medicine Clinical Pharmacist  [phone]

## 2020-08-08 NOTE — PROGRESS NOTES
Elevated troponin  Reassessed EKG it appeared repolarization equivocal to me initially there are some ST segment changes.  Contacted on-call cardiologist to review EKG and clinical history.  Patient is pain-free.  With troponin going up I started patient on heparin.  Echocardiogram is scheduled for morning.    As recommended by cardiologist we will recheck EKG  if there are any dynamic changes will reconsult cardiology.  For now we will continue heparin which can be discontinued if morning   Patient is a smoker.  And has questionable family history.    Repeat EKG pending

## 2020-08-08 NOTE — PLAN OF CARE
A/O x 4. VSS on RA. Denied pain. Tele SB/SR, denied CP . LS clear, denied SOB. PIV to left intact, SL. Up ad tavon in room, steady gait and denies dizziness. Adequate for discharge today, planned transport home via family. Will continue POC.

## 2020-08-10 LAB — INTERPRETATION ECG - MUSE: NORMAL

## 2020-08-12 LAB — INTERPRETATION ECG - MUSE: NORMAL
